# Patient Record
Sex: FEMALE | Race: BLACK OR AFRICAN AMERICAN | URBAN - METROPOLITAN AREA
[De-identification: names, ages, dates, MRNs, and addresses within clinical notes are randomized per-mention and may not be internally consistent; named-entity substitution may affect disease eponyms.]

---

## 2017-03-15 ENCOUNTER — OFFICE VISIT (OUTPATIENT)
Dept: FAMILY MEDICINE | Facility: CLINIC | Age: 32
End: 2017-03-15
Payer: COMMERCIAL

## 2017-03-15 VITALS
HEART RATE: 98 BPM | DIASTOLIC BLOOD PRESSURE: 74 MMHG | SYSTOLIC BLOOD PRESSURE: 120 MMHG | OXYGEN SATURATION: 97 % | BODY MASS INDEX: 28.66 KG/M2 | HEIGHT: 65 IN | TEMPERATURE: 98.9 F | WEIGHT: 172 LBS | RESPIRATION RATE: 16 BRPM

## 2017-03-15 DIAGNOSIS — L03.011 PARONYCHIA OF FINGER, RIGHT: Primary | ICD-10-CM

## 2017-03-15 PROCEDURE — 10060 I&D ABSCESS SIMPLE/SINGLE: CPT | Performed by: FAMILY MEDICINE

## 2017-03-15 RX ORDER — CEPHALEXIN 500 MG/1
500 CAPSULE ORAL 3 TIMES DAILY
Qty: 9 CAPSULE | Refills: 0 | Status: SHIPPED | OUTPATIENT
Start: 2017-03-15 | End: 2017-03-18

## 2017-03-15 ASSESSMENT — PAIN SCALES - GENERAL: PAINLEVEL: SEVERE PAIN (6)

## 2017-03-15 NOTE — NURSING NOTE
"Chief Complaint   Patient presents with     Finger       Initial /74 (BP Location: Right arm, Patient Position: Chair, Cuff Size: Adult Large)  Pulse 98  Temp 98.9  F (37.2  C) (Oral)  Resp 16  Ht 5' 4.5\" (1.638 m)  Wt 172 lb (78 kg)  LMP 03/10/2017  SpO2 97%  Breastfeeding? No  BMI 29.07 kg/m2 Estimated body mass index is 29.07 kg/(m^2) as calculated from the following:    Height as of this encounter: 5' 4.5\" (1.638 m).    Weight as of this encounter: 172 lb (78 kg).  Medication Reconciliation: complete     Abby Li MA       "

## 2017-03-15 NOTE — MR AVS SNAPSHOT
"              After Visit Summary   3/15/2017    Lynette Blackmon    MRN: [id number]           Patient Information     Date Of Birth          1985        Visit Information        Provider Department      3/15/2017 1:20 PM Stacy Diallo MD [facility]        Today's Diagnoses     Paronychia of finger, right    -  1       Follow-ups after your visit        Follow-up notes from your care team     Return if symptoms worsen or fail to improve.      Who to contact     If you have questions or need follow up information about today's clinic visit or your schedule please contact [facility] directly at [phone].  Normal or non-critical lab and imaging results will be communicated to you by MyChart, letter or phone within 4 business days after the clinic has received the results. If you do not hear from us within 7 days, please contact the clinic through [employer] or phone. If you have a critical or abnormal lab result, we will notify you by phone as soon as possible.  Submit refill requests through [employer] or call your pharmacy and they will forward the refill request to us. Please allow 3 business days for your refill to be completed.          Additional Information About Your Visit        MyChart Information     [employer] gives you secure access to your electronic health record. If you see a primary care provider, you can also send messages to your care team and make appointments. If you have questions, please call your primary care clinic.  If you do not have a primary care provider, please call [phone] and they will assist you.        Care EveryWhere ID     This is your Care EveryWhere ID. This could be used by other organizations to access your [place] medical records  [id number]        Your Vitals Were     Pulse Temperature Respirations Height Last Period Pulse Oximetry    98 98.9  F (37.2  C) (Oral) 16 5' 4.5\" (1.638 m) 03/10/2017 97%    Breastfeeding? BMI (Body Mass " Index)                No 29.07 kg/m2           Blood Pressure from Last 3 Encounters:   03/15/17 120/74   11/29/16 120/78   07/20/16 116/78    Weight from Last 3 Encounters:   03/15/17 172 lb (78 kg)   11/29/16 185 lb 11.2 oz (84.2 kg)   07/20/16 187 lb (84.8 kg)              We Performed the Following     DRAIN SKIN ABSCESS SIMPLE/SINGLE          Today's Medication Changes          These changes are accurate as of: 3/15/17  2:09 PM.  If you have any questions, ask your nurse or doctor.               Start taking these medicines.        Dose/Directions    cephALEXin 500 MG capsule   Commonly known as:  KEFLEX   Used for:  Paronychia of finger, right   Started by:  Stacy Diallo MD        Dose:  500 mg   Take 1 capsule (500 mg) by mouth 3 times daily for 3 days   Quantity:  9 capsule   Refills:  0            Where to get your medicines      These medications were sent to [facility] - [place], [place] - [address]  [address], [facility] [address]     Phone:  [phone]     cephALEXin 500 MG capsule                Primary Care Provider Office Phone # Fax #    Carolin Clark [phone]       [facility] [address]  [facility] [address]        Thank you!     Thank you for choosing [facility]  for your care. Our goal is always to provide you with excellent care. Hearing back from our patients is one way we can continue to improve our services. Please take a few minutes to complete the written survey that you may receive in the mail after your visit with us. Thank you!             Your Updated Medication List - Protect others around you: Learn how to safely use, store and throw away your medicines at www.disposemymeds.org.          This list is accurate as of: 3/15/17  2:09 PM.  Always use your most recent med list.                   Brand Name Dispense Instructions for use    cephALEXin 500 MG capsule    KEFLEX    9  capsule    Take 1 capsule (500 mg) by mouth 3 times daily for 3 days

## 2017-03-15 NOTE — PROGRESS NOTES
"  SUBJECTIVE:                                                    Lynette Blackmon is a 32 year old female who presents to clinic today for the following health issues:      Finger infection  -Middle finger right hand  -X 3 days  -Redness  -Swelling  -Warm to the touch    SUBJECTIVE:  Here today with the above symptoms that started a few days ago. Does not remember any particular damage. It has been very sore, confined to the distal right third finger. No fevers or chills or systemic symptoms. No previous history    Review of systems otherwise negative.  Past medical, family, and social history reviewed and updated in chart.    OBJECTIVE:  /74 (BP Location: Right arm, Patient Position: Chair, Cuff Size: Adult Large)  Pulse 98  Temp 98.9  F (37.2  C) (Oral)  Resp 16  Ht 1.638 m (5' 4.5\")  Wt 78 kg (172 lb)  LMP 03/10/2017  SpO2 97%  Breastfeeding? No  BMI 29.07 kg/m2  Alert, pleasant, upbeat, and in no apparent discomfort.  Right third finger - sizable paronychia ulnar right third nail bed  Skin was cleansed with alcohol and a #11 scalpel was used to make a cross incision directly over the paronychia. Pus expressed  Bandaged    ASSESSMENT / PLAN:  (L03.011) Paronychia of finger, right  (primary encounter diagnosis)  Comment: Discussed aftercare including soaking. Few days of antibiotics to speed up recovery and prevent spread  Plan: DRAIN SKIN ABSCESS SIMPLE/SINGLE, cephALEXin         (KEFLEX) 500 MG capsule            Follow up as needed   S. Abilio Diallo MD    (Chart documentation completed in part with Dragon voice-recognition software.  Even though reviewed some grammatical, spelling, and word errors may remain.)      "

## 2017-10-22 ENCOUNTER — HEALTH MAINTENANCE LETTER (OUTPATIENT)
Age: 32
End: 2017-10-22

## 2018-01-11 ENCOUNTER — NURSE TRIAGE (OUTPATIENT)
Dept: NURSING | Facility: CLINIC | Age: 33
End: 2018-01-11

## 2018-01-11 NOTE — TELEPHONE ENCOUNTER
This morning Sade is having chest pain.  Pain in on left side.  Sade denies sweating, nausea and cool and clammy.    Pain is not constant, no pain when taking a deep breath.

## 2018-01-11 NOTE — TELEPHONE ENCOUNTER
"  Reason for Disposition    [1] Chest pain lasting <= 5 minutes AND [2] NO chest pain or cardiac symptoms now(Exceptions: pains lasting a few seconds)    Additional Information    Negative: Severe difficulty breathing (e.g., struggling for each breath, speaks in single words)    Negative: Difficult to awaken or acting confused (e.g., disoriented, slurred speech)    Negative: Shock suspected (e.g., cold/pale/clammy skin, too weak to stand, low BP, rapid pulse)    Negative: [1] Chest pain lasts > 5 minutes AND [2] history of heart disease  (i.e., heart attack, bypass surgery, angina, angioplasty, CHF; not just a heart murmur)    Negative: [1] Chest pain lasts > 5 minutes AND [2] described as crushing, pressure-like, or heavy    Negative: [1] Chest pain lasts > 5 minutes AND [2] age > 50    Negative: [1] Chest pain lasts > 5 minutes AND [2] age > 30 AND [3] at least one cardiac risk factor (i.e., hypertension, diabetes, obesity, smoker or strong family history of heart disease)    Negative: [1] Chest pain lasts > 5 minutes AND [2] not relieved with nitroglycerin    Negative: Passed out (i.e., lost consciousness, collapsed and was not responding)    Negative: Heart beating < 50 beats per minute OR > 140 beats per minute    Negative: Visible sweat on face or sweat dripping down face    Negative: Sounds like a life-threatening emergency to the triager    Negative: Followed a chest injury    Negative: SEVERE chest pain    Negative: [1] Intermittent  chest pain or \"angina\" AND [2] increasing in severity or frequency  (Exception: pains lasting a few seconds)    Negative: Pain also present in shoulder(s) or arm(s) or jaw  (Exception: pain is clearly made worse by movement)    Negative: Difficulty breathing    Negative: Dizziness or lightheadedness    Negative: Coughing up blood    Negative: Cocaine use within last 3 days    Negative: History of prior \"blood clot\" in leg or lungs (i.e., deep vein thrombosis, pulmonary " embolism)    Negative: Recent illness requiring prolonged bedrest (i.e., immobilization)    Negative: Hip or leg fracture in past 2 months (e.g., had cast on leg or ankle)    Negative: Major surgery in the past month    Negative: Recent long-distance travel with prolonged time in car, bus, plane, or train (i.e., within past 2 weeks; 6 or  more hours duration)    Negative: Chest pain lasts > 5 minutes (Exceptions: chest pain occurring > 3 days ago and now asymptomatic; same as previously diagnosed heartburn and has accompanying sour taste in mouth)    Negative: Taking a deep breath makes pain worse    Negative: Patient sounds very sick or weak to the triager    Negative: [1] Chest pain lasts > 5 minutes AND [2] occurred > 3 days ago (72 hours) AND [3] NO chest pain or cardiac symptoms now    Protocols used: CHEST PAIN-ADULT-

## 2018-01-12 ENCOUNTER — OFFICE VISIT (OUTPATIENT)
Dept: PEDIATRICS | Facility: CLINIC | Age: 33
End: 2018-01-12
Payer: COMMERCIAL

## 2018-01-12 ENCOUNTER — RADIANT APPOINTMENT (OUTPATIENT)
Dept: GENERAL RADIOLOGY | Facility: CLINIC | Age: 33
End: 2018-01-12
Attending: INTERNAL MEDICINE
Payer: COMMERCIAL

## 2018-01-12 VITALS
BODY MASS INDEX: 30.25 KG/M2 | DIASTOLIC BLOOD PRESSURE: 84 MMHG | HEART RATE: 92 BPM | SYSTOLIC BLOOD PRESSURE: 126 MMHG | TEMPERATURE: 96.8 F | WEIGHT: 179 LBS | OXYGEN SATURATION: 99 %

## 2018-01-12 DIAGNOSIS — G89.29 CHRONIC BILATERAL LOW BACK PAIN WITH LEFT-SIDED SCIATICA: ICD-10-CM

## 2018-01-12 DIAGNOSIS — M54.42 CHRONIC BILATERAL LOW BACK PAIN WITH LEFT-SIDED SCIATICA: ICD-10-CM

## 2018-01-12 DIAGNOSIS — R07.9 ACUTE CHEST PAIN: ICD-10-CM

## 2018-01-12 DIAGNOSIS — R07.9 ACUTE CHEST PAIN: Primary | ICD-10-CM

## 2018-01-12 LAB — D DIMER PPP FEU-MCNC: 0.4 UG/ML FEU (ref 0–0.5)

## 2018-01-12 PROCEDURE — 99214 OFFICE O/P EST MOD 30 MIN: CPT | Performed by: INTERNAL MEDICINE

## 2018-01-12 PROCEDURE — 71046 X-RAY EXAM CHEST 2 VIEWS: CPT | Performed by: RADIOLOGY

## 2018-01-12 PROCEDURE — 93000 ELECTROCARDIOGRAM COMPLETE: CPT | Performed by: INTERNAL MEDICINE

## 2018-01-12 PROCEDURE — 36415 COLL VENOUS BLD VENIPUNCTURE: CPT | Performed by: INTERNAL MEDICINE

## 2018-01-12 PROCEDURE — 85379 FIBRIN DEGRADATION QUANT: CPT | Performed by: INTERNAL MEDICINE

## 2018-01-12 NOTE — MR AVS SNAPSHOT
After Visit Summary   1/12/2018    Lynette Blackmon    MRN: [id number]           Patient Information     Date Of Birth          1985        Visit Information        Provider Department      1/12/2018 2:10 PM Diann Duke MD PhD [facility]        Today's Diagnoses     Acute chest pain    -  1    Chronic bilateral low back pain with left-sided sciatica           Follow-ups after your visit        Who to contact     If you have questions or need follow up information about today's clinic visit or your schedule please contact [facility] directly at [phone].  Normal or non-critical lab and imaging results will be communicated to you by [employer], letter or phone within 4 business days after the clinic has received the results. If you do not hear from us within 7 days, please contact the clinic through [employer] or phone. If you have a critical or abnormal lab result, we will notify you by phone as soon as possible.  Submit refill requests through [employer] or call your pharmacy and they will forward the refill request to us. Please allow 3 business days for your refill to be completed.          Additional Information About Your Visit        MyChart Information     [employer] gives you secure access to your electronic health record. If you see a primary care provider, you can also send messages to your care team and make appointments. If you have questions, please call your primary care clinic.  If you do not have a primary care provider, please call [phone] and they will assist you.      [employer] is an electronic gateway that provides easy, online access to your medical records. With [employer], you can request a clinic appointment, read your test results, renew a prescription or communicate with your care team.     To access your existing account, please contact your [facility] or call [phone] for assistance.        Care EveryWhere ID      This is your Care EveryWhere ID. This could be used by other organizations to access your [place] medical records  [id number]        Your Vitals Were     Pulse Temperature Pulse Oximetry BMI (Body Mass Index)          92 96.8  F (36  C) (Temporal) 99% 30.25 kg/m2         Blood Pressure from Last 3 Encounters:   01/12/18 126/84   03/15/17 120/74   11/29/16 120/78    Weight from Last 3 Encounters:   01/12/18 179 lb (81.2 kg)   03/15/17 172 lb (78 kg)   11/29/16 185 lb 11.2 oz (84.2 kg)              We Performed the Following     D dimer, quantitative     EKG 12-lead complete w/read - Clinics        Primary Care Provider    None Specified       No primary provider on file.        Equal Access to Services     TONI SNOWDEN : Vianca Cantrell, waxu goddard, carmine kaalkendy farmer, kodak sommer . So [facility] [phone].    ATENCIÓN: Si habla español, tiene a horowitz disposición servicios gratuitos de asistencia lingüística. Llame al [phone].    We comply with applicable federal civil rights laws and [place] laws. We do not discriminate on the basis of race, color, national origin, age, disability, sex, sexual orientation, or gender identity.            Thank you!     Thank you for choosing [facility]  for your care. Our goal is always to provide you with excellent care. Hearing back from our patients is one way we can continue to improve our services. Please take a few minutes to complete the written survey that you may receive in the mail after your visit with us. Thank you!             Your Updated Medication List - Protect others around you: Learn how to safely use, store and throw away your medicines at www.disposemymeds.org.      Notice  As of 1/12/2018  6:00 PM    You have not been prescribed any medications.

## 2018-01-12 NOTE — PROGRESS NOTES
SUBJECTIVE:   Lynette Blackmon 32 year old female who presents to clinic today for the following health issues:      Chest Pain, also sciatica       Onset: yesterday    Description (location/character/radiation/duration): left chest and moved to the back    Intensity:  mild    Accompanying signs and symptoms:        Shortness of breath: no        Sweating: no        Nausea/vomitting: no        Palpitations: no        Other (fevers/chills/cough/heartburn/lightheadedness): no     History (similar episodes/previous evaluation): has happen in past but no eval    Precipitating or alleviating factors:       Worse with exertion: no        Worse with breathing: no        Related to eating: no        Better with burping: no     Therapies tried and outcome: None    =============  She is to be a patient at our clinic.  She moved to [place] in  after she got .  She flew in to the [place] [facility] to attend a .    Started earlier in the week, mild, but while she was on the airplane to the [place], sharp left sided and penetrated to the back. It was present for about 5 minutes and then went away.  Since then he has come and go.  It always comes when she is sitting down. When she walks around, she has no pain. She does not have pain currently.     Denies shortness of breath. No nausea, vomiting. Pain not related to eating.  Reports history of intermittent constipation resulting in upper quadrant lower rib cage abdominal pain bilaterally.  This was evaluated in the past.  Thought this was due to constipation.  Currently she denies constipation.  She has occasional acid reflux.  But does not think this is similar to her chest pain.    When she sits down she has sciatica discomfort. On the airplane, the left sciatica was irritated, it was when the pain started.  Left-sided sciatica is chronic.  Patient has been to physical therapy.  She takes ibuprofen as needed.    ROS:  Constitutional, HEENT,  cardiovascular, pulmonary, gi and gu systems are negative, except as otherwise noted.      No current outpatient prescriptions on file prior to visit.  No current facility-administered medications on file prior to visit.       Problem list, Medication list, Allergies, and Medical/Social/Surgical histories reviewed in [facility] and updated as appropriate.    OBJECTIVE:                                                    /84  Pulse 92  Temp 96.8  F (36  C) (Temporal)  Wt 179 lb (81.2 kg)  SpO2 99%  BMI 30.25 kg/m2    GEN: healthy, alert and no distress  HEENT: unremarkable.  Neck:     supple, no thyromegaly, no cervical lymphadenopathy.   BRANDIN:  CTA B/L, no wheezing or crackles.  Chest wall: No tenderness to palpation over the anterior chest wall.  CV:  RRR no murmur.  Intact distal pulses, good cap refill.  Abd: soft, normal active bowel sounds, non tender, non distended. No mass or organomegaly.   Ext:  no cyanosis, clubbing or edema.         Chest x-ray: Clear by my reading    ECG: Normal sinus rhythm       ASSESSMENT/PLAN:                                                      32 year old female with the following diagnoses and treatment plan:      ICD-10-CM    1. Acute chest pain R07.9 EKG 12-lead complete w/read - Clinics     XR Chest 2 Views     D dimer, quantitative   2. Chronic bilateral low back pain with left-sided sciatica M54.42     G89.29        Her description of the acute chest pain sounds worrisome. Patient currently is completely asymptomatic with normal exam.  The chest pain is unlikely life-threatening such as angina, dissection, pneumothorax.  Given that this happened while she was on the airplane, PE is a possibility but that patient is completely asymptomatic.  To completely rule this out I will obtain a d-dimer.  If this becomes positive, we will have patient do a chest CT.  For the time being I encouraged the patient to correlate symptoms whether this correlates with her sciatica pain or  stress level.    Will call or return to clinic if worsening or symptoms not improving as discussed.  See Patient Instructions.        Diann Duke MD-PhD  [facility]    (Note: Chart documentation was done in part with Dragon Voice Recognition software. Although reviewed after completion, some word and grammatical errors may remain.)

## 2018-01-12 NOTE — NURSING NOTE
"Chief Complaint   Patient presents with     Chest Pain       Initial /84  Pulse 92  Temp 96.8  F (36  C) (Temporal)  Wt 179 lb (81.2 kg)  SpO2 99%  BMI 30.25 kg/m2 Estimated body mass index is 30.25 kg/(m^2) as calculated from the following:    Height as of 3/15/17: 5' 4.5\" (1.638 m).    Weight as of this encounter: 179 lb (81.2 kg).  Medication Reconciliation: complete    "

## 2018-01-13 NOTE — PROGRESS NOTES
Dear Lynette,   Here are your recent results which are within the expected range.  Please continue with your current plan of care.     Please call or Mychart to our office if you have further questions.     Diann Duke MD-PhD

## 2018-01-14 NOTE — PROGRESS NOTES
Shruthi Blackmon,    Attached are your test results.    Is out of the office and we are reviewing your results for you.  Marker is normal   Please follow up if further concerns or questions      Please contact us if you have any questions.    Tigist Melendez, CNP

## 2020-03-01 ENCOUNTER — HEALTH MAINTENANCE LETTER (OUTPATIENT)
Age: 35
End: 2020-03-01

## 2020-12-14 ENCOUNTER — HEALTH MAINTENANCE LETTER (OUTPATIENT)
Age: 35
End: 2020-12-14

## 2021-04-17 ENCOUNTER — HEALTH MAINTENANCE LETTER (OUTPATIENT)
Age: 36
End: 2021-04-17

## 2021-10-02 ENCOUNTER — HEALTH MAINTENANCE LETTER (OUTPATIENT)
Age: 36
End: 2021-10-02

## 2022-05-08 ENCOUNTER — HEALTH MAINTENANCE LETTER (OUTPATIENT)
Age: 37
End: 2022-05-08

## 2023-01-14 ENCOUNTER — HEALTH MAINTENANCE LETTER (OUTPATIENT)
Age: 38
End: 2023-01-14

## 2023-06-02 ENCOUNTER — HEALTH MAINTENANCE LETTER (OUTPATIENT)
Age: 38
End: 2023-06-02